# Patient Record
Sex: MALE | Race: WHITE | Employment: PART TIME | URBAN - METROPOLITAN AREA
[De-identification: names, ages, dates, MRNs, and addresses within clinical notes are randomized per-mention and may not be internally consistent; named-entity substitution may affect disease eponyms.]

---

## 2017-03-20 ENCOUNTER — OFFICE VISIT (OUTPATIENT)
Dept: FAMILY MEDICINE CLINIC | Age: 27
End: 2017-03-20

## 2017-03-20 VITALS
OXYGEN SATURATION: 97 % | TEMPERATURE: 98 F | HEART RATE: 81 BPM | BODY MASS INDEX: 28.39 KG/M2 | WEIGHT: 202.8 LBS | SYSTOLIC BLOOD PRESSURE: 129 MMHG | DIASTOLIC BLOOD PRESSURE: 78 MMHG | RESPIRATION RATE: 18 BRPM | HEIGHT: 71 IN

## 2017-03-20 DIAGNOSIS — N45.1 EPIDIDYMITIS: Primary | ICD-10-CM

## 2017-03-20 LAB
BILIRUB UR QL STRIP: NEGATIVE
GLUCOSE UR-MCNC: NEGATIVE MG/DL
KETONES P FAST UR STRIP-MCNC: NEGATIVE MG/DL
PH UR STRIP: 7 [PH] (ref 4.6–8)
PROT UR QL STRIP: NEGATIVE MG/DL
SP GR UR STRIP: 1.01 (ref 1–1.03)
UA UROBILINOGEN AMB POC: NORMAL (ref 0.2–1)
URINALYSIS CLARITY POC: CLEAR
URINALYSIS COLOR POC: YELLOW
URINE BLOOD POC: NORMAL
URINE LEUKOCYTES POC: NEGATIVE
URINE NITRITES POC: NEGATIVE

## 2017-03-20 RX ORDER — HYDROCODONE BITARTRATE AND ACETAMINOPHEN 5; 325 MG/1; MG/1
1 TABLET ORAL
Qty: 21 TAB | Refills: 0 | Status: SHIPPED | OUTPATIENT
Start: 2017-03-20 | End: 2017-05-22

## 2017-03-20 RX ORDER — CEFTRIAXONE 250 MG/8ML
250 INJECTION, POWDER, FOR SOLUTION INTRAMUSCULAR; INTRAVENOUS ONCE
Qty: 250 MG | Refills: 0
Start: 2017-03-20 | End: 2017-03-20

## 2017-03-20 RX ORDER — DOXYCYCLINE 100 MG/1
100 TABLET ORAL 2 TIMES DAILY
Qty: 20 TAB | Refills: 0 | Status: SHIPPED | OUTPATIENT
Start: 2017-03-20 | End: 2017-03-30

## 2017-03-20 NOTE — PROGRESS NOTES
HPI  Efe Meraz is a 27 y.o. male who presents with bilateral testicular pain. Happened 5 days ago. Feels like [place] kicked me\". It is constant, no modifying factors other than sometimes hurts when he walks. Currently 6/10. Thinks that in some ways is a little better and some with a little worse since it started. No urinary symptoms other than a little burning at the end of urination. There is no blood in the ejaculate. Capable of ejaculating since onset    PMHx:  No past medical history on file. Meds:   Current Outpatient Prescriptions   Medication Sig Dispense Refill    HYDROcodone-acetaminophen (NORCO) 5-325 mg per tablet Take 1 Tab by mouth every six (6) hours as needed for Pain. Max Daily Amount: 4 Tabs. 21 Tab 0    doxycycline (ADOXA) 100 mg tablet Take 1 Tab by mouth two (2) times a day for 10 days. 20 Tab 0    cefTRIAXone (ROCEPHIN) 250 mg injection 250 mg by IntraMUSCular route once for 1 dose. 250 mg 0    ibuprofen (MOTRIN) 600 mg tablet Take 1 Tab by mouth three (3) times daily as needed for Pain. 90 Tab 0       Allergies:   No Known Allergies    Smoker:  History   Smoking Status    Never Smoker   Smokeless Tobacco    Never Used       ETOH:   History   Alcohol Use    Yes     Comment: socially       FH:   Family History   Problem Relation Age of Onset    Cancer Maternal Grandmother      bladder and colon    Elevated Lipids Maternal Grandfather     Hypertension Maternal Grandfather     Diabetes Maternal Grandfather     Heart Disease Maternal Grandfather        ROS:  As listed in HPI. In addition:  Constitutional:   No headache, fever, fatigue, weight loss or weight gain      Cardiac:    No chest pain      Resp:   No cough or shortness of breath      Neuro   No loss of consciousness, dizziness, seizures      Physical Exam:  Blood pressure 129/78, pulse 81, temperature 98 °F (36.7 °C), resp. rate 18, height 5' 11\" (1.803 m), weight 202 lb 12.8 oz (92 kg), SpO2 97 %.   GEN: No apparent distress. Alert and oriented and responds to all questions appropriately. ABDOMEN: Soft; nontender; nondistended; normoactive bowel sounds; no masses or organomegaly  : Testicles are not swollen, scrotum is not red. There is pain at the inferior pole of the testicle and up the posterior aspect consistent with epididymitis. The right testicle slightly more tender than the left but both are tender  NEUROLOGIC:  No focal neurologic deficits. Strength and sensation grossly intact. Coordination and gait grossly intact. EXT: Well perfused. No edema. SKIN: No obvious rashes. Assessment and Plan     Epididymitis, unknown etiology  Bilateral presentation  Moderate symptoms, mild exam  Testicular ultrasound cover bases  Treat for possible STI (last intercourse 6 months ago, not monogamous. There is no penile discharge to test  Urinalysis unremarkable. Urine culture  I like to see him back in 3 days but have provided him with a urology referral if he feels like he is not better in that time. Mumps is in the community (albeit rarely) no apparent viral syndrome other than a upper respiratory infection 3 weeks ago      ICD-10-CM ICD-9-CM    1. Epididymitis N45.1 604.90 HYDROcodone-acetaminophen (NORCO) 5-325 mg per tablet      US SCROTUM/TESTICLES      doxycycline (ADOXA) 100 mg tablet      REFERRAL TO UROLOGY      cefTRIAXone (ROCEPHIN) 250 mg injection      CEFTRIAXONE SODIUM INJECTION  MG      [place] THER/PROPH/DIAG INJECTION, SUBCUT/IM      CULTURE, URINE      AMB POC URINALYSIS DIP STICK AUTO W/O MICRO       AVS given.  Pt expressed understanding of instructions

## 2017-03-20 NOTE — PATIENT INSTRUCTIONS
Epididymitis and Orchitis: Care Instructions  Your Care Instructions    Epididymitis is pain and swelling of the tube that is attached to each testicle. This tube is called the epididymis. Orchitis is pain and swelling of the testicle. Infection with bacteria often causes these conditions. Sexually transmitted infections (STIs) also can cause both conditions. This is often the case in men younger than 28. Other causes in boys and older men are infections from surgery or having a catheter that drains urine. The mumps virus also can cause orchitis. Anti-inflammatory or pain medicines can help with the pain. Antibiotics are used if the problem is caused by bacteria. They are not used if a virus is the cause. Your testicle may stay swollen for many days or even a few weeks. The doctor has checked you carefully, but problems can develop later. If you notice any problems or new symptoms, get medical treatment right away. Follow-up care is a key part of your treatment and safety. Be sure to make and go to all appointments, and call your doctor if you are having problems. It's also a good idea to know your test results and keep a list of the medicines you take. How can you care for yourself at home? · If your doctor prescribed antibiotics, take them as directed. Do not stop taking them just because you feel better. You need to take the full course of antibiotics. · Ask your doctor if you can take an over-the-counter pain medicine, such as acetaminophen (Tylenol), ibuprofen (Advil, Motrin), or naproxen (Aleve). Be safe with medicines. Read and follow all instructions on the label. · Limit your activity to what is comfortable. · Wear snug underwear or an athletic supporter. This can help reduce pain. · Apply either cold or heat to the swollen area. Use the one that works best for your pain. Sitting in a warm bath for 15 minutes twice a day will help reduce the swelling more quickly.   · If you have been told that an STI may have caused your condition, do not have sex until your doctor says it is safe. This will prevent spreading the infection. Tell your sex partner or partners that they need to be checked. They may need treatment. When should you call for help? Call your doctor now or seek immediate medical care if:  · Your pain gets worse. · You have a new or higher fever. · You have new or more swelling of your testicle. · You have new belly pain, or your pain gets worse. Watch closely for changes in your health, and be sure to contact your doctor if:  · You do not get better as expected. Where can you learn more? Go to http://benny-franklin.info/. Enter S360 in the search box to learn more about \"Epididymitis and Orchitis: Care Instructions. \"  Current as of: August 12, 2016  Content Version: 11.1  © 1936-5168 [employer]. Care instructions adapted under license by [employer] (which disclaims liability or warranty for this information). If you have questions about a medical condition or this instruction, always ask your healthcare professional. [address] any warranty or liability for your use of this information.

## 2017-03-20 NOTE — PROGRESS NOTES
.  Chief Complaint   Patient presents with    Testicle Pain     started 5-6 days ago     . Vandana Avendano

## 2017-03-20 NOTE — MR AVS SNAPSHOT
Visit Information Date & Time Provider Department Dept. Phone Encounter #  
 3/20/2017  1:40 PM Rhea Jimenez MD [facility] [phone] [id number] Upcoming Health Maintenance Date Due INFLUENZA AGE 9 TO ADULT 8/1/2016 DTaP/Tdap/Td series (7 - Td) 8/24/2020 Allergies as of 3/20/2017  Review Complete On: 3/20/2017 By: Rhea Jimenez MD  
 No Known Allergies Current Immunizations  Reviewed on 8/20/2013 Name Date DTAP Vaccine 9/15/1998, 7/26/1994, 6/25/1993, 4/23/1993, 2/19/1993 HIB Vaccine 7/26/1994, 5/4/1994, 4/23/1993, 2/19/1993 Hepatitis B Vaccine 10/5/1993, 2/19/1993 IPV 9/15/1998, 7/26/1994, 4/23/1993, 2/19/1993 MMR Vaccine 9/15/1998, 5/4/1994 Meningococcal Vaccine 6/24/2005 TD Vaccine 6/24/2005 TDAP Vaccine 8/24/2010 Varicella Virus Vaccine Live 8/24/2010, 10/9/1997 Not reviewed this visit You Were Diagnosed With   
  
 Codes Comments Epididymitis    -  Primary ICD-10-CM: N45.1 ICD-9-CM: 604.90 Vitals BP Pulse Temp Resp Height(growth percentile) Weight(growth percentile) 129/78 (BP 1 Location: Right arm, BP Patient Position: Sitting) 81 98 °F (36.7 °C) 18 5' 11\" (1.803 m) 202 lb 12.8 oz (92 kg) SpO2 BMI Smoking Status 97% 28.28 kg/m2 Never Smoker Vitals History BMI and BSA Data Body Mass Index Body Surface Area  
 28.28 kg/m 2 2.15 m 2 Preferred Pharmacy Pharmacy Name Phone [facility] [phone] Your Updated Medication List  
  
   
This list is accurate as of: 3/20/17  2:23 PM.  Always use your most recent med list.  
  
  
  
  
 cefTRIAXone 250 mg injection Commonly known as:  ROCEPHIN  
250 mg by IntraMUSCular route once for 1 dose. doxycycline 100 mg tablet Commonly known as:  ADOXA Take 1 Tab by mouth two (2) times a day for 10 days. HYDROcodone-acetaminophen 5-325 mg per tablet Commonly known as:  Lazaro Ill Take 1 Tab by mouth every six (6) hours as needed for Pain. Max Daily Amount: 4 Tabs. ibuprofen 600 mg tablet Commonly known as:  MOTRIN Take 1 Tab by mouth three (3) times daily as needed for Pain. Prescriptions Printed Refills HYDROcodone-acetaminophen (NORCO) 5-325 mg per tablet 0 Sig: Take 1 Tab by mouth every six (6) hours as needed for Pain. Max Daily Amount: 4 Tabs. Class: Print Route: Oral  
  
Prescriptions Sent to Pharmacy Refills  
 doxycycline (ADOXA) 100 mg tablet 0 Sig: Take 1 Tab by mouth two (2) times a day for 10 days. Class: Normal  
 Pharmacy: [address] #: [phone] Route: Oral  
  
We Performed the Following AMB POC URINALYSIS DIP STICK AUTO W/O MICRO [66337 CPT(R)] CEFTRIAXONE SODIUM INJECTION  MG [ [facility]] Comments:  
 Mix per protocol CULTURE, URINE [id number] CPT(R)] [place] THER/PROPH/DIAG INJECTION, SUBCUT/IM [id number] CPT(R)] REFERRAL TO UROLOGY [[facility]] Comments:  
 Please evaluate patient for testicular pain. To-Do List   
 03/20/2017 Imaging:  US SCROTUM/TESTICLES Referral Information Referral ID Referred By Referred To  
  
 [id number] [facility] [address]   
   [facility], [address] Visits Status Start Date End Date 1 New Request 3/20/17 3/20/18 If your referral has a status of pending review or denied, additional information will be sent to support the outcome of this decision. Patient Instructions Epididymitis and Orchitis: Care Instructions Your Care Instructions Epididymitis is pain and swelling of the tube that is attached to each testicle. This tube is called the epididymis. Orchitis is pain and swelling of the testicle.  Infection with bacteria often causes these conditions. Sexually transmitted infections (STIs) also can cause both conditions. This is often the case in men younger than 28. Other causes in boys and older men are infections from surgery or having a catheter that drains urine. The mumps virus also can cause orchitis. Anti-inflammatory or pain medicines can help with the pain. Antibiotics are used if the problem is caused by bacteria. They are not used if a virus is the cause. Your testicle may stay swollen for many days or even a few weeks. The doctor has checked you carefully, but problems can develop later. If you notice any problems or new symptoms, get medical treatment right away. Follow-up care is a key part of your treatment and safety. Be sure to make and go to all appointments, and call your doctor if you are having problems. It's also a good idea to know your test results and keep a list of the medicines you take. How can you care for yourself at home? · If your doctor prescribed antibiotics, take them as directed. Do not stop taking them just because you feel better. You need to take the full course of antibiotics. · Ask your doctor if you can take an over-the-counter pain medicine, such as acetaminophen (Tylenol), ibuprofen (Advil, Motrin), or naproxen (Aleve). Be safe with medicines. Read and follow all instructions on the label. · Limit your activity to what is comfortable. · Wear snug underwear or an athletic supporter. This can help reduce pain. · Apply either cold or heat to the swollen area. Use the one that works best for your pain. Sitting in a warm bath for 15 minutes twice a day will help reduce the swelling more quickly. · If you have been told that an STI may have caused your condition, do not have sex until your doctor says it is safe. This will prevent spreading the infection. Tell your sex partner or partners that they need to be checked. They may need treatment. When should you call for help? Call your doctor now or seek immediate medical care if: 
· Your pain gets worse. · You have a new or higher fever. · You have new or more swelling of your testicle. · You have new belly pain, or your pain gets worse. Watch closely for changes in your health, and be sure to contact your doctor if: 
· You do not get better as expected. Where can you learn more? Go to http://benny-franklin.info/. Enter S360 in the search box to learn more about \"Epididymitis and Orchitis: Care Instructions. \" Current as of: August 12, 2016 Content Version: 11.1 © 7814-3423 [employer]. Care instructions adapted under license by [employer] (which disclaims liability or warranty for this information). If you have questions about a medical condition or this instruction, always ask your healthcare professional. [facility] any warranty or liability for your use of this information. Introducing [address]! Gabriella Perry introduces ZuzuChe patient portal. Now you can access parts of your medical record, email your doctor's office, and request medication refills online. 1. In your internet browser, go to [email or web address] 2. Click on the First Time User? Click Here link in the Sign In box. You will see the New Member Sign Up page. 3. Enter your ZuzuChe Access Code exactly as it appears below. You will not need to use this code after youve completed the sign-up process. If you do not sign up before the expiration date, you must request a new code. · ZuzuChe Access Code: [email or web address] Expires: 6/18/2017  1:36 PM 
 
4. Enter the last four digits of your Social Security Number (xxxx) and Date of Birth (mm/dd/yyyy) as indicated and click Submit. You will be taken to the next sign-up page. 5. Create a ZuzuChe ID. This will be your ZuzuChe login ID and cannot be changed, so think of one that is secure and easy to remember. 6. Create a Pulmonx password. You can change your password at any time. 7. Enter your Password Reset Question and Answer. This can be used at a later time if you forget your password. 8. Enter your e-mail address. You will receive e-mail notification when new information is available in [address] 9. Click Sign Up. You can now view and download portions of your medical record. 10. Click the Download Summary menu link to download a portable copy of your medical information. If you have questions, please visit the Frequently Asked Questions section of the [employer] website. Remember, Pul[employer]x is NOT to be used for urgent needs. For medical emergencies, dial 911. Now available from your iPhone and Android! Please provide this summary of care documentation to your next provider. Your primary care clinician is listed as Heide Mccabe. If you have any questions after today's visit, please call [phone].

## 2017-03-21 LAB — BACTERIA UR CULT: NO GROWTH

## 2017-03-23 ENCOUNTER — HOSPITAL ENCOUNTER (OUTPATIENT)
Dept: ULTRASOUND IMAGING | Age: 27
Discharge: HOME | End: 2017-03-23
Attending: FAMILY MEDICINE
Payer: COMMERCIAL

## 2017-03-23 DIAGNOSIS — N45.1 EPIDIDYMITIS: ICD-10-CM

## 2017-03-23 PROCEDURE — 76870 US EXAM SCROTUM: CPT

## 2017-03-24 ENCOUNTER — OFFICE VISIT (OUTPATIENT)
Dept: FAMILY MEDICINE CLINIC | Age: 27
End: 2017-03-24

## 2017-03-24 VITALS
WEIGHT: 202 LBS | TEMPERATURE: 98 F | HEART RATE: 72 BPM | RESPIRATION RATE: 18 BRPM | BODY MASS INDEX: 28.28 KG/M2 | DIASTOLIC BLOOD PRESSURE: 80 MMHG | SYSTOLIC BLOOD PRESSURE: 136 MMHG | HEIGHT: 71 IN | OXYGEN SATURATION: 97 %

## 2017-03-24 DIAGNOSIS — N45.1 EPIDIDYMITIS: Primary | ICD-10-CM

## 2017-03-24 NOTE — PROGRESS NOTES
HPI  Kay Lynne is a 27 y.o. male who presents to follow-up on bilateral epididymitis. Taking doxycycline, got Rocephin IM when he was here 4 days ago. Has noticed improvement in the pain. On initial presentation had rated as 6/10 (with no improvement 1 week) and now rates it as 4/10 in the right testicle and lasts in the left testicle. Pain is still in the same location in the posterior aspect of the testicles. Better tolerated. Sleeping better with the aid of Norco.  Taking Motrin 600 mg without much benefit    PMHx:  No past medical history on file. Meds:   Current Outpatient Prescriptions   Medication Sig Dispense Refill    HYDROcodone-acetaminophen (NORCO) 5-325 mg per tablet Take 1 Tab by mouth every six (6) hours as needed for Pain. Max Daily Amount: 4 Tabs. 21 Tab 0    doxycycline (ADOXA) 100 mg tablet Take 1 Tab by mouth two (2) times a day for 10 days. 20 Tab 0    ibuprofen (MOTRIN) 600 mg tablet Take 1 Tab by mouth three (3) times daily as needed for Pain. 90 Tab 0       Allergies:   No Known Allergies    Smoker:  History   Smoking Status    Never Smoker   Smokeless Tobacco    Never Used       ETOH:   History   Alcohol Use    Yes     Comment: socially       FH:   Family History   Problem Relation Age of Onset    Cancer Maternal Grandmother      bladder and colon    Elevated Lipids Maternal Grandfather     Hypertension Maternal Grandfather     Diabetes Maternal Grandfather     Heart Disease Maternal Grandfather        ROS:  As listed in HPI. In addition:  Constitutional:   No headache, fever, fatigue, weight loss or weight gain      Cardiac:    No chest pain      Resp:   No cough or shortness of breath      Neuro   No loss of consciousness, dizziness, seizures      Physical Exam:  Blood pressure 136/80, pulse 72, temperature 98 °F (36.7 °C), resp. rate 18, height 5' 11\" (1.803 m), weight 202 lb (91.6 kg), SpO2 97 %. GEN: No apparent distress.  Alert and oriented and responds to all questions appropriately. NEUROLOGIC:  No focal neurologic deficits. Strength and sensation grossly intact. Coordination and gait grossly intact. EXT: Well perfused. No edema. SKIN: No obvious rashes. Assessment and Plan     Epididymitis, unclear etiology but seems to be responding well to antibiotics  Ultrasound unremarkable  Urine culture unremarkable  There is no penile discharge on initial exam showed no STD testing sent, did not want to add insult to injury. Reassuring recovery although slow. Has a urology appointment in 4 days which I would like him to keep for a second opinion. ICD-10-CM ICD-9-CM    1. Epididymitis N45.1 604.90        AVS given.  Pt expressed understanding of instructions

## 2017-05-22 ENCOUNTER — OFFICE VISIT (OUTPATIENT)
Dept: FAMILY MEDICINE CLINIC | Age: 27
End: 2017-05-22

## 2017-05-22 VITALS
DIASTOLIC BLOOD PRESSURE: 76 MMHG | WEIGHT: 198 LBS | OXYGEN SATURATION: 98 % | TEMPERATURE: 98 F | RESPIRATION RATE: 12 BRPM | BODY MASS INDEX: 27.72 KG/M2 | HEART RATE: 67 BPM | HEIGHT: 71 IN | SYSTOLIC BLOOD PRESSURE: 119 MMHG

## 2017-05-22 DIAGNOSIS — J30.2 SEASONAL ALLERGIC RHINITIS, UNSPECIFIED ALLERGIC RHINITIS TRIGGER: Primary | ICD-10-CM

## 2017-05-22 NOTE — PROGRESS NOTES
HPI  Suzy Kauffman is a 27 y.o. male who presents with congestion, cough, scratchy throat. Started 2 weeks ago. Poor sleep, fatigue. Has tried Claritin-D for 1 week with only mild relief and then tried Allegra with decongestant for a week with mild relief. Has had bad seasonal allergies this time of year for the last 3 years    PMHx:  No past medical history on file. Meds:       Allergies:   No Known Allergies    Smoker:  History   Smoking Status    Never Smoker   Smokeless Tobacco    Never Used       ETOH:   History   Alcohol Use    Yes     Comment: socially       FH:   Family History   Problem Relation Age of Onset    Cancer Maternal Grandmother      bladder and colon    Elevated Lipids Maternal Grandfather     Hypertension Maternal Grandfather     Diabetes Maternal Grandfather     Heart Disease Maternal Grandfather        ROS:   As listed in HPI. In addition:  Constitutional:   No headache, fever, weight loss or weight gain      Eyes:   No redness, pruritis, pain, visual changes, swelling, or discharge      Ears:    No pain, loss or changes in hearing     Cardiac:    No chest pain      Resp:   No shortness of breath or wheeze      Neuro   No loss of consciousness, dizziness, seizure    Physical Exam:  Blood pressure 119/76, pulse 67, temperature 98 °F (36.7 °C), resp. rate 12, height 5' 11\" (1.803 m), weight 198 lb (89.8 kg), SpO2 98 %. GEN: No apparent distress. EYES:  Conjunctiva clear  EAR: TM are clear and without effusion. NOSE: Turbinates are congested  OROPHYARYNX: No erythema or tonsilar exudates  NECK:  Submandibular LAD. Non tender         LUNGS: Respirations unlabored; clear to auscultation bilaterally. No wheeze  CARDIOVASCULAR: Regular, rate, and rhythm  ABDOMEN: Soft; nontender;nl BS  SKIN: No obvious rashes.        Assessment and Plan     Seasonal allergic rhinitis  Antihistamines try Allegra  Nasal steroids  Nasal saline can help  Local honey can help    If you do not see any improvement in about 2 weeks return and we can try Singulair    RTC if ssx worsen or fail to improve as expected      ICD-10-CM ICD-9-CM    1. Seasonal allergic rhinitis, unspecified allergic rhinitis trigger J30.2 477.9        AVS given.  Pt expressed understanding of instructions

## 2017-05-22 NOTE — PROGRESS NOTES
.  Chief Complaint   Patient presents with    Sinus Pain    Nasal Discharge    Fatigue     . Shyla

## 2017-10-05 ENCOUNTER — OFFICE VISIT (OUTPATIENT)
Dept: FAMILY MEDICINE CLINIC | Age: 27
End: 2017-10-05

## 2017-10-05 VITALS
SYSTOLIC BLOOD PRESSURE: 115 MMHG | HEART RATE: 63 BPM | DIASTOLIC BLOOD PRESSURE: 76 MMHG | HEIGHT: 71 IN | RESPIRATION RATE: 12 BRPM | WEIGHT: 189 LBS | TEMPERATURE: 98 F | BODY MASS INDEX: 26.46 KG/M2 | OXYGEN SATURATION: 98 %

## 2017-10-05 DIAGNOSIS — M75.21 BICEPS TENDINITIS OF RIGHT UPPER EXTREMITY: Primary | ICD-10-CM

## 2017-10-05 NOTE — PROGRESS NOTES
HPI  Andres Crawley is a 27 y.o. male who presents with right shoulder pain that has been bothering him for about 2 weeks. No particular injury. He is doing a lot of heavy lifting and overhead arm movements. Currently self-employed as a . The motions that are bothering him the most are lifting heavy objects and an aching in the front of his shoulder at night. He has noticed recently that his left shoulder is also bothering him because he is compensating and using his left arm more    PMHx:  No past medical history on file. Meds:       Allergies:   No Known Allergies    Smoker:  History   Smoking Status    Never Smoker   Smokeless Tobacco    Never Used       ETOH:   History   Alcohol Use    Yes     Comment: socially       FH:   Family History   Problem Relation Age of Onset    Cancer Maternal Grandmother      bladder and colon    Elevated Lipids Maternal Grandfather     Hypertension Maternal Grandfather     Diabetes Maternal Grandfather     Heart Disease Maternal Grandfather        ROS:   As listed in HPI. In addition:  Constitutional:   No headache, fever, fatigue, weight loss or weight gain      Cardiac:    No chest pain      Resp:   No cough or shortness of breath      Neuro   No loss of consciousness, dizziness, seizures      Physical Exam:  Blood pressure 115/76, pulse 63, temperature 98 °F (36.7 °C), resp. rate 12, height 5' 11\" (1.803 m), weight 189 lb (85.7 kg), SpO2 98 %. GEN: No apparent distress. Alert and oriented and responds to all questions appropriately. NEUROLOGIC:  No focal neurologic deficits. Strength and sensation grossly intact. Coordination and gait grossly intact. EXT: Well perfused. No edema. SKIN: No obvious rashes. shoulder examined, full passive range of motion, full active range of motion. Right shoulder has pain over the short head of the biceps tendon with external rotation and resisted supination.   Rotator cuff is intact       Assessment and Plan Tendinitis of the short head of the biceps, right side. There is some pain to palpation of the right infraspinatus but no pain on rotator cuff exam.  The left shoulder is intact just sore. Provided extensive education on exercises that he can do and provided with an exercise band. Recommend he do rotator cuff exercises and biceps tendinitis exercises. NSAIDs as needed for pain. Recommend he try to take a week of light duty to rest this      ICD-10-CM ICD-9-CM    1. Biceps tendinitis of right upper extremity M75.21 726.12        AVS given.  Pt expressed understanding of instructions

## 2017-10-05 NOTE — PATIENT INSTRUCTIONS
Biceps Tendinitis: Exercises  Your Care Instructions  Here are some examples of typical rehabilitation exercises for your condition. Start each exercise slowly. Ease off the exercise if you start to have pain. Your doctor or physical therapist will tell you when you can start these exercises and which ones will work best for you. How to do the exercises  Biceps stretch    1. Stand and hold your affected arm out to the side, with your hand at about hip level. 2. Gently bend your wrist back so that your fingers point down toward the floor. 3. You may also do this next to a wall and rest your fingers on the wall. 4. For more of a stretch, bend your head to the opposite side of your affected arm. 5. Hold for 15 to 30 seconds. 6. Repeat 2 to 4 times. Resisted supination    Note: For this and the following exercises, you will need elastic exercise material, such as surgical tubing or Thera-Band. 1. Sit leaning forward with your legs slightly spread. Then place your forearm on your thigh with your hand and affected wrist in front of your knee. 2. Grasp one end of an exercise band with your palm down, and step on the other end. 3. Keeping your wrist straight, roll your palm outward and away from your thigh for a count of 2, then slowly move your wrist back to the starting position to a count of 5.  4. Repeat 8 to 12 times. Resisted elbow flexion    1. Using your affected arm, hold one end of an elastic band in your hand. 2. Place the other end of the band under your foot on the same side of your body as your affected arm. 3. Slowly bend your elbow and bring your hand toward your shoulder. Your palm and the underside of your wrist should be facing up as you pull the band toward your shoulder. Count to 2 as you pull up. 4. Relax and slowly return to your starting position. Count to 5 as you return to the start. 5. Repeat 8 to 12 times. Resisted elbow flexion at shoulder level    1.  Tie the ends of an exercise band together to form a loop. Attach one end of the loop to a secure object or shut a door on it to hold it in place. (Or you can have someone hold one end of the loop to provide resistance.) The band should be at shoulder level. 2. Stand facing where you have tied or fastened the band. 3. Start with your affected arm held out straight, holding the band in your hand. 4. Slowly bend your elbow to 90 degrees, bringing your hand toward your forehead. Count to 2 as you pull the band toward your head. 5. Relax and slowly return to your starting position. Count to 5 as you return to the start. 6. Repeat 8 to 12 times. Follow-up care is a key part of your treatment and safety. Be sure to make and go to all appointments, and call your doctor if you are having problems. It's also a good idea to know your test results and keep a list of the medicines you take. Where can you learn more? Go to http://benny-franklin.info/. Enter X011 in the search box to learn more about \"Biceps Tendinitis: Exercises. \"  Current as of: March 21, 2017  Content Version: 11.3  © 3351-9454 [employer]. Care instructions adapted under license by [employer] (which disclaims liability or warranty for this information). If you have questions about a medical condition or this instruction, always ask your healthcare professional. [place] [address] any warranty or liability for your use of this information. Rotator Cuff: Exercises  Your Care Instructions  Here are some examples of typical rehabilitation exercises for your condition. Start each exercise slowly. Ease off the exercise if you start to have pain. Your doctor or physical therapist will tell you when you can start these exercises and which ones will work best for you. How to do the exercises  Pendulum swing    Note: If you have pain in your back, do not do this exercise.   7. Hold on to a table or the back of a chair with your good arm. Then bend forward a little and let your sore arm hang straight down. This exercise does not use the arm muscles. Rather, use your legs and your hips to create movement that makes your arm swing freely. 8. Use the movement from your hips and legs to guide the slightly swinging arm back and forth like a pendulum (or elephant trunk). Then guide it in circles that start small (about the size of a dinner plate). Make the circles a bit larger each day, as your pain allows. 9. Do this exercise for 5 minutes, 5 to 7 times each day. 10. As you have less pain, try bending over a little farther to do this exercise. This will increase the amount of movement at your shoulder. Posterior stretching exercise    5. Hold the elbow of your injured arm with your other hand. 6. Use your hand to pull your injured arm gently up and across your body. You will feel a gentle stretch across the back of your injured shoulder. 7. Hold for at least 15 to 30 seconds. Then slowly lower your arm. 8. Repeat 2 to 4 times. Up-the-back stretch    Note: Your doctor or physical therapist may want you to wait to do this stretch until you have regained most of your range of motion and strength. You can do this stretch in different ways. Hold any of these stretches for at least 15 to 30 seconds. Repeat them 2 to 4 times. 6. Put your hand in your back pocket. Let it rest there to stretch your shoulder. 7. With your other hand, hold your injured arm (palm outward) behind your back by the wrist. Pull your arm up gently to stretch your shoulder. 8. Next, put a towel over your other shoulder. Put the hand of your injured arm behind your back. Now hold the back end of the towel. With the other hand, hold the front end of the towel in front of your body. Pull gently on the front end of the towel. This will bring your hand farther up your back to stretch your shoulder. Overhead stretch    7.  Standing about an arm's length away, grasp onto a solid surface. You could use a countertop, a doorknob, or the back of a sturdy chair. 8. With your knees slightly bent, bend forward with your arms straight. Lower your upper body, and let your shoulders stretch. 9. As your shoulders are able to stretch farther, you may need to take a step or two backward. 10. Hold for at least 15 to 30 seconds. Then stand up and relax. If you had stepped back during your stretch, step forward so you can keep your hands on the solid surface. 11. Repeat 2 to 4 times. Shoulder flexion (lying down)    Note: To make a wand for this exercise, use a piece of PVC pipe or a broom handle with the broom removed. Make the wand about a foot wider than your shoulders. 1. Lie on your back, holding a wand with both hands. Your palms should face down as you hold the wand. 2. Keeping your elbows straight, slowly raise your arms over your head. Raise them until you feel a stretch in your shoulders, upper back, and chest.  3. Hold for 15 to 30 seconds. 4. Repeat 2 to 4 times. Shoulder rotation (lying down)    Note: To make a wand for this exercise, use a piece of PVC pipe or a broom handle with the broom removed. Make the wand about a foot wider than your shoulders. 1. Lie on your back. Hold a wand with both hands with your elbows bent and palms up. 2. Keep your elbows close to your body, and move the wand across your body toward the sore arm. 3. Hold for 8 to 12 seconds. 4. Repeat 2 to 4 times. Wall climbing (to the side)    Note: Avoid any movement that is straight to your side, and be careful not to arch your back. Your arm should stay about 30 degrees to the front of your side. 1. Stand with your side to a wall so that your fingers can just touch it at an angle about 30 degrees toward the front of your body. 2. Walk the fingers of your injured arm up the wall as high as pain permits. Try not to shrug your shoulder up toward your ear as you move your arm up.   3. Hold that position for a count of at least 15 to 20.  4. Walk your fingers back down to the starting position. 5. Repeat at least 2 to 4 times. Try to reach higher each time. Wall climbing (to the front)    Note: During this stretching exercise, be careful not to arch your back. 1. Face a wall, and stand so your fingers can just touch it. 2. Keeping your shoulder down, walk the fingers of your injured arm up the wall as high as pain permits. (Don't shrug your shoulder up toward your ear.)  3. Hold your arm in that position for at least 15 to 30 seconds. 4. Slowly walk your fingers back down to where you started. 5. Repeat at least 2 to 4 times. Try to reach higher each time. Shoulder blade squeeze    1. Stand with your arms at your sides, and squeeze your shoulder blades together. Do not raise your shoulders up as you squeeze. 2. Hold 6 seconds. 3. Repeat 8 to 12 times. Scapular exercise: Arm reach    1. Lie flat on your back. This exercise is a very slight motion that starts with your arms raised (elbows straight, arms straight). 2. From this position, reach higher toward the [place] or ceiling. Keep your elbows straight. All motion should be from your shoulder blade only. 3. Relax your arms back to where you started. 4. Repeat 8 to 12 times. Arm raise to the side    Note: During this strengthening exercise, your arm should stay about 30 degrees to the front of your side. 1. Slowly raise your injured arm to the side, with your thumb facing up. Raise your arm 60 degrees at the most (shoulder level is 90 degrees). 2. Hold the position for 3 to 5 seconds. Then lower your arm back to your side. If you need to, bring your \"good\" arm across your body and place it under the elbow as you lower your injured arm. Use your good arm to keep your injured arm from dropping down too fast.  3. Repeat 8 to 12 times. 4. When you first start out, don't hold any extra weight in your hand.  As you get stronger, you may use a 1-pound to 2-pound dumbbell or a small can of food. Shoulder flexor and extensor exercise    Note: These are isometric exercises. That means you contract your muscles without actually moving. · Push forward (flex): Stand facing a wall or doorjamb, about 6 inches or less back. Hold your injured arm against your body. Make a closed fist with your thumb on top. Then gently push your hand forward into the wall with about 25% to 50% of your strength. Don't let your body move backward as you push. Hold for about 6 seconds. Relax for a few seconds. Repeat 8 to 12 times. · Push backward (extend): Stand with your back flat against a wall. Your upper arm should be against the wall, with your elbow bent 90 degrees (your hand straight ahead). Push your elbow gently back against the wall with about 25% to 50% of your strength. Don't let your body move forward as you push. Hold for about 6 seconds. Relax for a few seconds. Repeat 8 to 12 times. Scapular exercise: Wall push-ups    Note: This exercise is best done with your fingers somewhat turned out, rather than straight up and down. 1. Stand facing a wall, about 12 inches to 18 inches away. 2. Place your hands on the wall at shoulder height. 3. Slowly bend your elbows and bring your face to the wall. Keep your back and hips straight. 4. Push back to where you started. 5. Repeat 8 to 12 times. 6. When you can do this exercise against a wall comfortably, you can try it against a counter. You can then slowly progress to the end of a couch, then to a sturdy chair, and finally to the floor. Scapular exercise: Retraction    Note: For this exercise, you will need elastic exercise material, such as surgical tubing or Thera-Band. 1. Put the band around a solid object at about waist level. (A bedpost will work well.) Each hand should hold an end of the band. 2. With your elbows at your sides and bent to 90 degrees, pull the band back.  Your shoulder blades should move toward each other. Then move your arms back where you started. 3. Repeat 8 to 12 times. 4. If you have good range of motion in your shoulders, try this exercise with your arms lifted out to the sides. Keep your elbows at a 90-degree angle. Raise the elastic band up to about shoulder level. Pull the band back to move your shoulder blades toward each other. Then move your arms back where you started. Internal rotator strengthening exercise    1. Start by tying a piece of elastic exercise material to a doorknob. You can use surgical tubing or Thera-Band. 2. Stand or sit with your shoulder relaxed and your elbow bent 90 degrees. Your upper arm should rest comfortably against your side. Squeeze a rolled towel between your elbow and your body for comfort. This will help keep your arm at your side. 3. Hold one end of the elastic band in the hand of the painful arm. 4. Slowly rotate your forearm toward your body until it touches your belly. Slowly move it back to where you started. 5. Keep your elbow and upper arm firmly tucked against the towel roll or at your side. 6. Repeat 8 to 12 times. External rotator strengthening exercise    1. Start by tying a piece of elastic exercise material to a doorknob. You can use surgical tubing or Thera-Band. (You may also hold one end of the band in each hand.)  2. Stand or sit with your shoulder relaxed and your elbow bent 90 degrees. Your upper arm should rest comfortably against your side. Squeeze a rolled towel between your elbow and your body for comfort. This will help keep your arm at your side. 3. Hold one end of the elastic band with the hand of the painful arm. 4. Start with your forearm across your belly. Slowly rotate the forearm out away from your body. Keep your elbow and upper arm tucked against the towel roll or the side of your body until you begin to feel tightness in your shoulder. Slowly move your arm back to where you started. 5. Repeat 8 to 12 times.   Follow-up care is a key part of your treatment and safety. Be sure to make and go to all appointments, and call your doctor if you are having problems. It's also a good idea to know your test results and keep a list of the medicines you take. Where can you learn more? Go to http://benny-franklin.info/. Enter [employer] in the search box to learn more about \"Rotator Cuff: Exercises. \"  Current as of: March 21, 2017  Content Version: 11.3  © 8429-8952 [employer]. Care instructions adapted under license by [employer] (which disclaims liability or warranty for this information). If you have questions about a medical condition or this instruction, always ask your healthcare professional. [address] any warranty or liability for your use of this information.

## 2017-10-05 NOTE — MR AVS SNAPSHOT
Visit Information Date & Time Provider Department Dept. Phone Encounter #  
 10/5/2017 10:40 AM Ramón Luciano MD [facility] [address] [facility] [phone] [id number] Upcoming Health Maintenance Date Due DTaP/Tdap/Td series (7 - Td) 8/24/2020 Allergies as of 10/5/2017  Review Complete On: 10/5/2017 By: Ramón Luciano MD  
 No Known Allergies Current Immunizations  Reviewed on 8/20/2013 Name Date DTAP Vaccine 9/15/1998, 7/26/1994, 6/25/1993, 4/23/1993, 2/19/1993 HIB Vaccine 7/26/1994, 5/4/1994, 4/23/1993, 2/19/1993 Hepatitis B Vaccine 10/5/1993, 2/19/1993 IPV 9/15/1998, 7/26/1994, 4/23/1993, 2/19/1993 MMR Vaccine 9/15/1998, 5/4/1994 Meningococcal Vaccine 6/24/2005 TD Vaccine 6/24/2005 TDAP Vaccine 8/24/2010 Varicella Virus Vaccine Live 8/24/2010, 10/9/1997 Not reviewed this visit Vitals BP Pulse Temp Resp Height(growth percentile) Weight(growth percentile) 115/76 (BP 1 Location: Left arm, BP Patient Position: Sitting) 63 98 °F (36.7 °C) 12 5' 11\" (1.803 m) 189 lb (85.7 kg) SpO2 BMI Smoking Status 98% 26.36 kg/m2 Never Smoker BMI and BSA Data Body Mass Index Body Surface Area  
 26.36 kg/m 2 2.07 m 2 Preferred Pharmacy Pharmacy Name Phone [facility] [phone] Your Updated Medication List  
  
Notice  As of 10/5/2017 11:07 AM  
 You have not been prescribed any medications. Patient Instructions Biceps Tendinitis: Exercises Your Care Instructions Here are some examples of typical rehabilitation exercises for your condition. Start each exercise slowly. Ease off the exercise if you start to have pain. Your doctor or physical therapist will tell you when you can start these exercises and which ones will work best for you. How to do the exercises Biceps stretch 1. Stand and hold your affected arm out to the side, with your hand at about hip level. 2. Gently bend your wrist back so that your fingers point down toward the floor. 3. You may also do this next to a wall and rest your fingers on the wall. 4. For more of a stretch, bend your head to the opposite side of your affected arm. 5. Hold for 15 to 30 seconds. 6. Repeat 2 to 4 times. Resisted supination Note: For this and the following exercises, you will need elastic exercise material, such as surgical tubing or Thera-Band. 1. Sit leaning forward with your legs slightly spread. Then place your forearm on your thigh with your hand and affected wrist in front of your knee. 2. Grasp one end of an exercise band with your palm down, and step on the other end. 3. Keeping your wrist straight, roll your palm outward and away from your thigh for a count of 2, then slowly move your wrist back to the starting position to a count of 5. 
4. Repeat 8 to 12 times. Resisted elbow flexion 1. Using your affected arm, hold one end of an elastic band in your hand. 2. Place the other end of the band under your foot on the same side of your body as your affected arm. 3. Slowly bend your elbow and bring your hand toward your shoulder. Your palm and the underside of your wrist should be facing up as you pull the band toward your shoulder. Count to 2 as you pull up. 4. Relax and slowly return to your starting position. Count to 5 as you return to the start. 5. Repeat 8 to 12 times. Resisted elbow flexion at shoulder level 1. Tie the ends of an exercise band together to form a loop. Attach one end of the loop to a secure object or shut a door on it to hold it in place. (Or you can have someone hold one end of the loop to provide resistance.) The band should be at shoulder level. 2. Stand facing where you have tied or fastened the band. 3. Start with your affected arm held out straight, holding the band in your hand. 4. Slowly bend your elbow to 90 degrees, bringing your hand toward your forehead. Count to 2 as you pull the band toward your head. 5. Relax and slowly return to your starting position. Count to 5 as you return to the start. 6. Repeat 8 to 12 times. Follow-up care is a key part of your treatment and safety. Be sure to make and go to all appointments, and call your doctor if you are having problems. It's also a good idea to know your test results and keep a list of the medicines you take. Where can you learn more? Go to http://benny-franklin.info/. Enter N969 in the search box to learn more about \"Biceps Tendinitis: Exercises. \" Current as of: March 21, 2017 Content Version: 11.3 © 1447-2907 [employer]. Care instructions adapted under license by [employer] (which disclaims liability or warranty for this information). If you have questions about a medical condition or this instruction, always ask your healthcare professional. [address] any warranty or liability for your use of this information. Rotator Cuff: Exercises Your Care Instructions Here are some examples of typical rehabilitation exercises for your condition. Start each exercise slowly. Ease off the exercise if you start to have pain. Your doctor or physical therapist will tell you when you can start these exercises and which ones will work best for you. How to do the exercises Pendulum swing Note: If you have pain in your back, do not do this exercise. 7. Hold on to a table or the back of a chair with your good arm. Then bend forward a little and let your sore arm hang straight down. This exercise does not use the arm muscles. Rather, use your legs and your hips to create movement that makes your arm swing freely. 8. Use the movement from your hips and legs to guide the slightly swinging arm back and forth like a pendulum (or elephant trunk).  Then guide it in circles that start small (about the size of a dinner plate). Make the circles a bit larger each day, as your pain allows. 9. Do this exercise for 5 minutes, 5 to 7 times each day. 10. As you have less pain, try bending over a little farther to do this exercise. This will increase the amount of movement at your shoulder. Posterior stretching exercise 5. Hold the elbow of your injured arm with your other hand. 6. Use your hand to pull your injured arm gently up and across your body. You will feel a gentle stretch across the back of your injured shoulder. 7. Hold for at least 15 to 30 seconds. Then slowly lower your arm. 8. Repeat 2 to 4 times. Up-the-back stretch Note: Your doctor or physical therapist may want you to wait to do this stretch until you have regained most of your range of motion and strength. You can do this stretch in different ways. Hold any of these stretches for at least 15 to 30 seconds. Repeat them 2 to 4 times. 6. Put your hand in your back pocket. Let it rest there to stretch your shoulder. 7. With your other hand, hold your injured arm (palm outward) behind your back by the wrist. Pull your arm up gently to stretch your shoulder. 8. Next, put a towel over your other shoulder. Put the hand of your injured arm behind your back. Now hold the back end of the towel. With the other hand, hold the front end of the towel in front of your body. Pull gently on the front end of the towel. This will bring your hand farther up your back to stretch your shoulder. Overhead stretch 7. Standing about an arm's length away, grasp onto a solid surface. You could use a countertop, a doorknob, or the back of a sturdy chair. 8. With your knees slightly bent, bend forward with your arms straight. Lower your upper body, and let your shoulders stretch. 9. As your shoulders are able to stretch farther, you may need to take a step or two backward. 10. Hold for at least 15 to 30 seconds. Then stand up and relax. If you had stepped back during your stretch, step forward so you can keep your hands on the solid surface. 11. Repeat 2 to 4 times. Shoulder flexion (lying down) Note: To make a wand for this exercise, use a piece of PVC pipe or a broom handle with the broom removed. Make the wand about a foot wider than your shoulders. 1. Lie on your back, holding a wand with both hands. Your palms should face down as you hold the wand. 2. Keeping your elbows straight, slowly raise your arms over your head. Raise them until you feel a stretch in your shoulders, upper back, and chest. 
3. Hold for 15 to 30 seconds. 4. Repeat 2 to 4 times. Shoulder rotation (lying down) Note: To make a wand for this exercise, use a piece of PVC pipe or a broom handle with the broom removed. Make the wand about a foot wider than your shoulders. 1. Lie on your back. Hold a wand with both hands with your elbows bent and palms up. 2. Keep your elbows close to your body, and move the wand across your body toward the sore arm. 3. Hold for 8 to 12 seconds. 4. Repeat 2 to 4 times. Wall climbing (to the side) Note: Avoid any movement that is straight to your side, and be careful not to arch your back. Your arm should stay about 30 degrees to the front of your side. 1. Stand with your side to a wall so that your fingers can just touch it at an angle about 30 degrees toward the front of your body. 2. Walk the fingers of your injured arm up the wall as high as pain permits. Try not to shrug your shoulder up toward your ear as you move your arm up. 3. Hold that position for a count of at least 15 to 20. 
4. Walk your fingers back down to the starting position. 5. Repeat at least 2 to 4 times. Try to reach higher each time. Wall climbing (to the front) Note: During this stretching exercise, be careful not to arch your back. 1. Face a wall, and stand so your fingers can just touch it. 2. Keeping your shoulder down, walk the fingers of your injured arm up the wall as high as pain permits. (Don't shrug your shoulder up toward your ear.) 3. Hold your arm in that position for at least 15 to 30 seconds. 4. Slowly walk your fingers back down to where you started. 5. Repeat at least 2 to 4 times. Try to reach higher each time. Shoulder blade squeeze 1. Stand with your arms at your sides, and squeeze your shoulder blades together. Do not raise your shoulders up as you squeeze. 2. Hold 6 seconds. 3. Repeat 8 to 12 times. Scapular exercise: Arm reach 1. Lie flat on your back. This exercise is a very slight motion that starts with your arms raised (elbows straight, arms straight). 2. From this position, reach higher toward the alexander or ceiling. Keep your elbows straight. All motion should be from your shoulder blade only. 3. Relax your arms back to where you started. 4. Repeat 8 to 12 times. Arm raise to the side Note: During this strengthening exercise, your arm should stay about 30 degrees to the front of your side. 1. Slowly raise your injured arm to the side, with your thumb facing up. Raise your arm 60 degrees at the most (shoulder level is 90 degrees). 2. Hold the position for 3 to 5 seconds. Then lower your arm back to your side. If you need to, bring your \"good\" arm across your body and place it under the elbow as you lower your injured arm. Use your good arm to keep your injured arm from dropping down too fast. 
3. Repeat 8 to 12 times. 4. When you first start out, don't hold any extra weight in your hand. As you get stronger, you may use a 1-pound to 2-pound dumbbell or a small can of food. Shoulder flexor and extensor exercise Note: These are isometric exercises. That means you contract your muscles without actually moving.  
· Push forward (flex): Stand facing a wall or doorjamb, about 6 inches or less back. Hold your injured arm against your body. Make a closed fist with your thumb on top. Then gently push your hand forward into the wall with about 25% to 50% of your strength. Don't let your body move backward as you push. Hold for about 6 seconds. Relax for a few seconds. Repeat 8 to 12 times. · Push backward (extend): Stand with your back flat against a wall. Your upper arm should be against the wall, with your elbow bent 90 degrees (your hand straight ahead). Push your elbow gently back against the wall with about 25% to 50% of your strength. Don't let your body move forward as you push. Hold for about 6 seconds. Relax for a few seconds. Repeat 8 to 12 times. Scapular exercise: Wall push-ups Note: This exercise is best done with your fingers somewhat turned out, rather than straight up and down. 1. Stand facing a wall, about 12 inches to 18 inches away. 2. Place your hands on the wall at shoulder height. 3. Slowly bend your elbows and bring your face to the wall. Keep your back and hips straight. 4. Push back to where you started. 5. Repeat 8 to 12 times. 6. When you can do this exercise against a wall comfortably, you can try it against a counter. You can then slowly progress to the end of a couch, then to a sturdy chair, and finally to the floor. Scapular exercise: Retraction Note: For this exercise, you will need elastic exercise material, such as surgical tubing or Thera-Band. 1. Put the band around a solid object at about waist level. (A bedpost will work well.) Each hand should hold an end of the band. 2. With your elbows at your sides and bent to 90 degrees, pull the band back. Your shoulder blades should move toward each other. Then move your arms back where you started. 3. Repeat 8 to 12 times. 4. If you have good range of motion in your shoulders, try this exercise with your arms lifted out to the sides.  Keep your elbows at a 90-degree angle. Raise the elastic band up to about shoulder level. Pull the band back to move your shoulder blades toward each other. Then move your arms back where you started. Internal rotator strengthening exercise 1. Start by tying a piece of elastic exercise material to a doorknob. You can use surgical tubing or Thera-Band. 2. Stand or sit with your shoulder relaxed and your elbow bent 90 degrees. Your upper arm should rest comfortably against your side. Squeeze a rolled towel between your elbow and your body for comfort. This will help keep your arm at your side. 3. Hold one end of the elastic band in the hand of the painful arm. 4. Slowly rotate your forearm toward your body until it touches your belly. Slowly move it back to where you started. 5. Keep your elbow and upper arm firmly tucked against the towel roll or at your side. 6. Repeat 8 to 12 times. External rotator strengthening exercise 1. Start by tying a piece of elastic exercise material to a doorknob. You can use surgical tubing or Thera-Band. (You may also hold one end of the band in each hand.) 2. Stand or sit with your shoulder relaxed and your elbow bent 90 degrees. Your upper arm should rest comfortably against your side. Squeeze a rolled towel between your elbow and your body for comfort. This will help keep your arm at your side. 3. Hold one end of the elastic band with the hand of the painful arm. 4. Start with your forearm across your belly. Slowly rotate the forearm out away from your body. Keep your elbow and upper arm tucked against the towel roll or the side of your body until you begin to feel tightness in your shoulder. Slowly move your arm back to where you started. 5. Repeat 8 to 12 times. Follow-up care is a key part of your treatment and safety. Be sure to make and go to all appointments, and call your doctor if you are having problems.  It's also a good idea to know your test results and keep a list of the medicines you take. Where can you learn more? Go to http://benny-franklin.info/. Enter Ayaz Vannbeth in the search box to learn more about \"Rotator Cuff: Exercises. \" Current as of: March 21, 2017 Content Version: 11.3 © 7111-6850 [employer]. Care instructions adapted under license by [employer] (which disclaims liability or warranty for this information). If you have questions about a medical condition or this instruction, always ask your healthcare professional. [address] any warranty or liability for your use of this information. Introducing [facility]! [employer] introduces [employer] patient portal. Now you can access parts of your medical record, email your doctor's office, and request medication refills online. 1. In your internet browser, go to [email or web address] 2. Click on the First Time User? Click Here link in the Sign In box. You will see the New Member Sign Up page. 3. Enter your [employer] Access Code exactly as it appears below. You will not need to use this code after youve completed the sign-up process. If you do not sign up before the expiration date, you must request a new code. · [employer] Access Code: [email or web address] Expires: 1/3/2018 10:27 AM 
 
4. Enter the last four digits of your Social Security Number (xxxx) and Date of Birth (mm/dd/yyyy) as indicated and click Submit. You will be taken to the next sign-up page. 5. Create a [employer] ID. This will be your [employer] login ID and cannot be changed, so think of one that is secure and easy to remember. 6. Create a [employer] password. You can change your password at any time. 7. Enter your Password Reset Question and Answer. This can be used at a later time if you forget your password. 8. Enter your e-mail address. You will receive e-mail notification when new information is available in [address] 9. Click Sign Up. You can now view and download portions of your medical record. 10. Click the Download Summary menu link to download a portable copy of your medical information. If you have questions, please visit the Frequently Asked Questions section of the [employer] website. Remember, [employer] is NOT to be used for urgent needs. For medical emergencies, dial 911. Now available from your iPhone and Android! Please provide this summary of care documentation to your next provider. Your primary care clinician is listed as Fransisco Closs. If you have any questions after today's visit, please call [phone].

## 2018-09-14 ENCOUNTER — OFFICE VISIT (OUTPATIENT)
Dept: FAMILY MEDICINE CLINIC | Age: 28
End: 2018-09-14

## 2018-09-14 VITALS
BODY MASS INDEX: 24.5 KG/M2 | RESPIRATION RATE: 18 BRPM | DIASTOLIC BLOOD PRESSURE: 70 MMHG | OXYGEN SATURATION: 98 % | WEIGHT: 175 LBS | HEART RATE: 59 BPM | SYSTOLIC BLOOD PRESSURE: 111 MMHG | TEMPERATURE: 97.9 F | HEIGHT: 71 IN

## 2018-09-14 DIAGNOSIS — R10.11 RIGHT UPPER QUADRANT ABDOMINAL PAIN: ICD-10-CM

## 2018-09-14 DIAGNOSIS — M62.838 MUSCLE SPASM: Primary | ICD-10-CM

## 2018-09-14 RX ORDER — CYCLOBENZAPRINE HCL 10 MG
10 TABLET ORAL
Qty: 30 TAB | Refills: 1 | Status: SHIPPED | OUTPATIENT
Start: 2018-09-14 | End: 2020-01-02

## 2018-09-14 NOTE — PROGRESS NOTES
HPI 
Andres Crawley is a 24 y.o. male who presents with pain in the right flank and the right upper quadrant. Has been bothering him intermittently for a few weeks. Assumed it was occasional muscle pains so he did not think anything of it. This past Tuesday (3 days ago) it became more constant. It affected his sleep on Tuesday and Wednesday. It is more noticeable with certain motions. It is more noticeable when he is lying in bed and rolling over. It is worse with lifting. He works as a  and has been taking it easy with his lifting. Denies fever. No loss of appetite. No nausea. Since the discomfort he has noticed a slight change in his bowel pattern. Apparently he is normally going to the bathroom 3 times a day today but has backed off to once a day and it is a little lower volume. Has tried aspirin and Advil with relief PMHx: 
No past medical history on file. Meds:  
Current Outpatient Prescriptions Medication Sig Dispense Refill  cyclobenzaprine (FLEXERIL) 10 mg tablet Take 1 Tab by mouth nightly as needed for Muscle Spasm(s). 30 Tab 1 Allergies:  
No Known Allergies Smoker: 
History Smoking Status  Never Smoker Smokeless Tobacco  
 Never Used ETOH:  
History Alcohol Use  Yes Comment: socially FH:  
Family History Problem Relation Age of Onset  Cancer Maternal Grandmother   
  bladder and colon  Elevated Lipids Maternal Grandfather  Hypertension Maternal Grandfather  Diabetes Maternal Grandfather  Heart Disease Maternal Grandfather ROS:  
As listed in HPI. In addition: 
Constitutional:   No headache, fever, fatigue, weight loss or weight gain Cardiac:    No chest pain Resp:   No cough or shortness of breath Neuro   No loss of consciousness, dizziness, seizures Physical Exam: 
Blood pressure 111/70, pulse (!) 59, temperature 97.9 °F (36.6 °C), resp. rate 18, height 5' 11\" (1.803 m), weight 175 lb (79.4 kg), SpO2 98 %. GEN: No apparent distress. Alert and oriented and responds to all questions appropriately. NEUROLOGIC:  No focal neurologic deficits. Strength and sensation grossly intact. Coordination and gait grossly intact. EXT: Well perfused. No edema. SKIN: No obvious rashes. Back examined. No pain in the thoracic or lumbar back. No posterior rib pain. No pain on lateral rib squeeze. Working around there is pain in the right flank, external oblique. Palpation of this area reproduces the pain that he has been having. Abdominal exam, normal active bowel sounds. There is no tenderness anywhere in the abdomen including the right upper quadrant where his pain complaint is Assessment and Plan Muscle spasm of the external oblique This can in fact refer pain to the right upper quadrant. Pattern is most consistent with muscular pain. Palpation reproduces the character and quality of the pain. This is reassuring. Try muscle relaxer, NSAIDs, stretches. Watch your lifting technique. A change in stool quality is probably not helping. Fiber supplement Asked him to keep an eye on this discomfort. I certainly would not expect him to get worse if he is doing stretches and exercises. If he is getting worse or symptoms evolve then we may need to consider ultrasound and blood work but this is not indicated just now Tells me he is drinking 2 beers 5 days of the week. Asked him to cut back to 1 or fewer Patient wants to return for CPE soon ICD-10-CM ICD-9-CM 1. Muscle spasm M62.838 728.85 cyclobenzaprine (FLEXERIL) 10 mg tablet 2. Right upper quadrant abdominal pain R10.11 789.01   
 
 
AVS given. Pt expressed understanding of instructions

## 2018-09-14 NOTE — MR AVS SNAPSHOT
[address] [facility] 
 
 
 [address] 
[phone] Patient: William Connor MRN:  :[id number] Visit Information Date & Time Provider Department Dept. Phone Encounter #  
 2018  3:20 PM Mae Millan MD Zaid Mi[address] [phone] Upcoming Health Maintenance Date Due Influenza Age 5 to Adult 2018 DTaP/Tdap/Td series (7 - Td) 2020 Allergies as of 2018  Review Complete On: 2018 By: Mae Millan MD  
 No Known Allergies Current Immunizations  Reviewed on 2013 Name Date DTAP Vaccine 9/15/1998, 1994, 1993, 1993, 1993 HIB Vaccine 1994, 1994, 1993, 1993 Hepatitis B Vaccine 10/5/1993, 1993 IPV 9/15/1998, 1994, 1993, 1993 MMR Vaccine 9/15/1998, 1994 Meningococcal Vaccine 2005 TD Vaccine 2005 TDAP Vaccine 2010 Varicella Virus Vaccine Live 2010, 10/9/1997 Not reviewed this visit You Were Diagnosed With   
  
 Codes Comments Muscle spasm    -  Primary ICD-10-CM: K11.259 ICD-9-CM: 728.85 Vitals BP Pulse Temp Resp Height(growth percentile) Weight(growth percentile) 111/70 (BP 1 Location: Left arm, BP Patient Position: Sitting) (!) 59 97.9 °F (36.6 °C) 18 5' 11\" (1.803 m) 175 lb (79.4 kg) SpO2 BMI Smoking Status 98% 24.41 kg/m2 Never Smoker BMI and BSA Data Body Mass Index Body Surface Area  
 24.41 kg/m 2 1.99 m 2 Preferred Pharmacy Pharmacy Name Phone [facility]  Asim jamal, Anthony Boykin [address] [facility] [phone] Your Updated Medication List  
  
   
This list is accurate as of 18  3:52 PM.  Always use your most recent med list.  
  
  
  
  
 cyclobenzaprine 10 mg tablet Commonly known as:  FLEXERIL  
 Take 1 Tab by mouth nightly as needed for Muscle Spasm(s). Prescriptions Sent to Pharmacy Refills  
 cyclobenzaprine (FLEXERIL) 10 mg tablet 1 Sig: Take 1 Tab by mouth nightly as needed for Muscle Spasm(s). Class: Normal  
 Pharmacy: [address] #: [phone] Route: Oral  
  
Introducing [facility]! [employer] introduces [employer] patient portal. Now you can access parts of your medical record, email your doctor's office, and request medication refills online. 1. In your internet browser, go to [email or web address] 2. Click on the First Time User? Click Here link in the Sign In box. You will see the New Member Sign Up page. 3. Enter your [employer] Access Code exactly as it appears below. You will not need to use this code after youve completed the sign-up process. If you do not sign up before the expiration date, you must request a new code. · [employer] Access Code: [email or web address] Expires: 12/13/2018  3:23 PM 
 
4. Enter the last four digits of your Social Security Number (xxxx) and Date of Birth (mm/dd/yyyy) as indicated and click Submit. You will be taken to the next sign-up page. 5. Create a [employer] ID. This will be your [employer] login ID and cannot be changed, so think of one that is secure and easy to remember. 6. Create a [employer] password. You can change your password at any time. 7. Enter your Password Reset Question and Answer. This can be used at a later time if you forget your password. 8. Enter your e-mail address. You will receive e-mail notification when new information is available in [address] 9. Click Sign Up. You can now view and download portions of your medical record. 10. Click the Download Summary menu link to download a portable copy of your medical information.  
 
If you have questions, please visit the Frequently Asked Questions section of the [employer]. Remember, [employer] is NOT to be used for urgent needs. For medical emergencies, dial 911. Now available from your iPhone and Android! Please provide this summary of care documentation to your next provider. Your primary care clinician is listed as Wendy Huitron. If you have any questions after today's visit, please call [phone].

## 2018-12-20 ENCOUNTER — OFFICE VISIT (OUTPATIENT)
Dept: FAMILY MEDICINE CLINIC | Age: 28
End: 2018-12-20

## 2018-12-20 VITALS
RESPIRATION RATE: 14 BRPM | HEART RATE: 62 BPM | HEIGHT: 71 IN | BODY MASS INDEX: 23.66 KG/M2 | SYSTOLIC BLOOD PRESSURE: 116 MMHG | WEIGHT: 169 LBS | OXYGEN SATURATION: 99 % | DIASTOLIC BLOOD PRESSURE: 73 MMHG | TEMPERATURE: 97.8 F

## 2018-12-20 DIAGNOSIS — Z00.00 WELL ADULT EXAM: Primary | ICD-10-CM

## 2018-12-20 DIAGNOSIS — Z80.0 FH: COLON CANCER IN RELATIVE <50 YEARS OLD: ICD-10-CM

## 2018-12-20 NOTE — PROGRESS NOTES
.  Chief Complaint   Patient presents with    Physical     .1. Have you been to the ER, urgent care clinic since your last visit? Hospitalized since your last visit? no    2. Have you seen or consulted any other health care providers outside of the [address] [place] [address] since your last visit? Include any pap smears or colon screening. No    .  Health Maintenance Due   Topic Date Due    Influenza Age 5 to Adult  08/01/2018     . Kari Escalante

## 2018-12-20 NOTE — PROGRESS NOTES
HPI  Vijaya Madera is a 34 y.o. male who presents for a checkup. Here at the request of his mother. Feels healthy. He is working as a , retail, . Goes to gym several days a week. Shoulder still bothering him. Orthopedist thinks it might be AC arthralgia. Offered cortisone injection but patient declined. That was 1 year ago. Diet is excellent    Family history of hypertension in several relatives on his mother's side. His mother is healthy, vegetarian. Maternal grandmother had colon cancer in her 45s or 46s. Patient's not sure what age exactly but she has had a colostomy the entire time he has been alive. Maternal grandfather had strokes starting in his 62s    PMHx:  No past medical history on file. Meds:   Current Outpatient Medications   Medication Sig Dispense Refill    cyclobenzaprine (FLEXERIL) 10 mg tablet Take 1 Tab by mouth nightly as needed for Muscle Spasm(s). 30 Tab 1       Allergies:   No Known Allergies    Smoker:  Social History     Tobacco Use   Smoking Status Never Smoker   Smokeless Tobacco Never Used       ETOH:   Social History     Substance and Sexual Activity   Alcohol Use Yes    Comment: socially       FH:   Family History   Problem Relation Age of Onset    Cancer Maternal Grandmother         bladder and colon    Elevated Lipids Maternal Grandfather     Hypertension Maternal Grandfather     Diabetes Maternal Grandfather     Heart Disease Maternal Grandfather        ROS:   As listed in HPI. In addition:  Constitutional:   No headache, fever, fatigue, weight loss or weight gain      Cardiac:    No chest pain      Resp:   No cough or shortness of breath      Neuro   No loss of consciousness, dizziness, seizures      Physical Exam:  Blood pressure 116/73, pulse 62, temperature 97.8 °F (36.6 °C), resp. rate 14, height 5' 11\" (1.803 m), weight 169 lb (76.7 kg), SpO2 99 %. GEN: No apparent distress.  Alert and oriented and responds to all questions appropriately. NEUROLOGIC:  No focal neurologic deficits. Strength and sensation grossly intact. Coordination and gait grossly intact. EXT: Well perfused. No edema. SKIN: No obvious rashes. Lungs clear to auscultation bilaterally  CV regular rate and rhythm no murmur  HEENT clear tympanic membranes, clear his mucosa, clear oral mucosa       Assessment and Plan     Well adult  Surveillance labs including lipid panel  He is in excellent health and unless there is something off wit the labs I would recommend repeat lab work every 2-3 years or if there is a change in his health such as an increase in his weight. Advocate a flu shot but declines today    Family history significant for relatively early colon cancer. Suggest he find out when his grandmother was diagnosed and that we refer him to GI 10 years prior to the age of her diagnosis      ICD-10-CM ICD-9-CM    1. Well adult exam Z00.00 V70.0 TSH 3RD GENERATION      METABOLIC PANEL, COMPREHENSIVE      CBC WITH AUTOMATED DIFF      LIPID PANEL   2. FH: colon cancer in relative <48years old Z80.0 V16.0        AVS given.  Pt expressed understanding of instructions

## 2018-12-21 LAB
ALBUMIN SERPL-MCNC: 4.6 G/DL (ref 3.5–5.5)
ALBUMIN/GLOB SERPL: 2.6 {RATIO} (ref 1.2–2.2)
ALP SERPL-CCNC: 69 IU/L (ref 39–117)
ALT SERPL-CCNC: 20 IU/L (ref 0–44)
AST SERPL-CCNC: 23 IU/L (ref 0–40)
BASOPHILS # BLD AUTO: 0 X10E3/UL (ref 0–0.2)
BASOPHILS NFR BLD AUTO: 0 %
BILIRUB SERPL-MCNC: 0.8 MG/DL (ref 0–1.2)
BUN SERPL-MCNC: 19 MG/DL (ref 6–20)
BUN/CREAT SERPL: 25 (ref 9–20)
CALCIUM SERPL-MCNC: 9.5 MG/DL (ref 8.7–10.2)
CHLORIDE SERPL-SCNC: 102 MMOL/L (ref 96–106)
CHOLEST SERPL-MCNC: 137 MG/DL (ref 100–199)
CO2 SERPL-SCNC: 23 MMOL/L (ref 20–29)
CREAT SERPL-MCNC: 0.76 MG/DL (ref 0.76–1.27)
EOSINOPHIL # BLD AUTO: 0.1 X10E3/UL (ref 0–0.4)
EOSINOPHIL NFR BLD AUTO: 2 %
ERYTHROCYTE [DISTWIDTH] IN BLOOD BY AUTOMATED COUNT: 13.7 % (ref 12.3–15.4)
GLOBULIN SER CALC-MCNC: 1.8 G/DL (ref 1.5–4.5)
GLUCOSE SERPL-MCNC: 97 MG/DL (ref 65–99)
HCT VFR BLD AUTO: 47.9 % (ref 37.5–51)
HDLC SERPL-MCNC: 66 MG/DL
HGB BLD-MCNC: 16 G/DL (ref 13–17.7)
IMM GRANULOCYTES # BLD: 0 X10E3/UL (ref 0–0.1)
IMM GRANULOCYTES NFR BLD: 0 %
INTERPRETATION, 910389: NORMAL
LDLC SERPL CALC-MCNC: 62 MG/DL (ref 0–99)
LYMPHOCYTES # BLD AUTO: 1.6 X10E3/UL (ref 0.7–3.1)
LYMPHOCYTES NFR BLD AUTO: 31 %
MCH RBC QN AUTO: 30.4 PG (ref 26.6–33)
MCHC RBC AUTO-ENTMCNC: 33.4 G/DL (ref 31.5–35.7)
MCV RBC AUTO: 91 FL (ref 79–97)
MONOCYTES # BLD AUTO: 0.4 X10E3/UL (ref 0.1–0.9)
MONOCYTES NFR BLD AUTO: 8 %
NEUTROPHILS # BLD AUTO: 3 X10E3/UL (ref 1.4–7)
NEUTROPHILS NFR BLD AUTO: 59 %
PLATELET # BLD AUTO: 205 X10E3/UL (ref 150–379)
POTASSIUM SERPL-SCNC: 4.7 MMOL/L (ref 3.5–5.2)
PROT SERPL-MCNC: 6.4 G/DL (ref 6–8.5)
RBC # BLD AUTO: 5.26 X10E6/UL (ref 4.14–5.8)
SODIUM SERPL-SCNC: 140 MMOL/L (ref 134–144)
TRIGL SERPL-MCNC: 43 MG/DL (ref 0–149)
TSH SERPL DL<=0.005 MIU/L-ACNC: 0.99 UIU/ML (ref 0.45–4.5)
VLDLC SERPL CALC-MCNC: 9 MG/DL (ref 5–40)
WBC # BLD AUTO: 5.1 X10E3/UL (ref 3.4–10.8)

## 2019-12-31 DIAGNOSIS — M62.838 MUSCLE SPASM: ICD-10-CM

## 2020-01-02 RX ORDER — CYCLOBENZAPRINE HCL 10 MG
TABLET ORAL
Qty: 30 TAB | Refills: 0 | Status: SHIPPED | OUTPATIENT
Start: 2020-01-02